# Patient Record
Sex: MALE | Race: WHITE | Employment: FULL TIME | URBAN - METROPOLITAN AREA
[De-identification: names, ages, dates, MRNs, and addresses within clinical notes are randomized per-mention and may not be internally consistent; named-entity substitution may affect disease eponyms.]

---

## 2024-03-26 ENCOUNTER — OFFICE VISIT (OUTPATIENT)
Dept: FAMILY MEDICINE CLINIC | Age: 64
End: 2024-03-26
Payer: COMMERCIAL

## 2024-03-26 VITALS
HEIGHT: 75 IN | SYSTOLIC BLOOD PRESSURE: 110 MMHG | DIASTOLIC BLOOD PRESSURE: 76 MMHG | HEART RATE: 76 BPM | OXYGEN SATURATION: 98 % | BODY MASS INDEX: 25.36 KG/M2 | WEIGHT: 204 LBS

## 2024-03-26 DIAGNOSIS — Z12.11 COLON CANCER SCREENING: ICD-10-CM

## 2024-03-26 DIAGNOSIS — Z00.00 ANNUAL PHYSICAL EXAM: Primary | ICD-10-CM

## 2024-03-26 DIAGNOSIS — N40.0 BENIGN PROSTATIC HYPERPLASIA, UNSPECIFIED WHETHER LOWER URINARY TRACT SYMPTOMS PRESENT: ICD-10-CM

## 2024-03-26 PROCEDURE — 99396 PREV VISIT EST AGE 40-64: CPT | Performed by: STUDENT IN AN ORGANIZED HEALTH CARE EDUCATION/TRAINING PROGRAM

## 2024-03-26 SDOH — SOCIAL STABILITY: SOCIAL INSECURITY: WITHIN THE LAST YEAR, HAVE YOU BEEN AFRAID OF YOUR PARTNER OR EX-PARTNER?: NO

## 2024-03-26 SDOH — HEALTH STABILITY: MENTAL HEALTH: HOW MANY STANDARD DRINKS CONTAINING ALCOHOL DO YOU HAVE ON A TYPICAL DAY?: 5 OR 6

## 2024-03-26 SDOH — SOCIAL STABILITY: SOCIAL NETWORK: HOW OFTEN DO YOU GET TOGETHER WITH FRIENDS OR RELATIVES?: ONCE A WEEK

## 2024-03-26 SDOH — SOCIAL STABILITY: SOCIAL NETWORK: IN A TYPICAL WEEK, HOW MANY TIMES DO YOU TALK ON THE PHONE WITH FAMILY, FRIENDS, OR NEIGHBORS?: ONCE A WEEK

## 2024-03-26 SDOH — ECONOMIC STABILITY: INCOME INSECURITY: IN THE LAST 12 MONTHS, WAS THERE A TIME WHEN YOU WERE NOT ABLE TO PAY THE MORTGAGE OR RENT ON TIME?: NO

## 2024-03-26 SDOH — SOCIAL STABILITY: SOCIAL NETWORK: ARE YOU MARRIED, WIDOWED, DIVORCED, SEPARATED, NEVER MARRIED, OR LIVING WITH A PARTNER?: MARRIED

## 2024-03-26 SDOH — ECONOMIC STABILITY: FOOD INSECURITY: WITHIN THE PAST 12 MONTHS, YOU WORRIED THAT YOUR FOOD WOULD RUN OUT BEFORE YOU GOT MONEY TO BUY MORE.: NEVER TRUE

## 2024-03-26 SDOH — ECONOMIC STABILITY: INCOME INSECURITY: HOW HARD IS IT FOR YOU TO PAY FOR THE VERY BASICS LIKE FOOD, HOUSING, MEDICAL CARE, AND HEATING?: NOT VERY HARD

## 2024-03-26 SDOH — SOCIAL STABILITY: SOCIAL INSECURITY
WITHIN THE LAST YEAR, HAVE YOU BEEN KICKED, HIT, SLAPPED, OR OTHERWISE PHYSICALLY HURT BY YOUR PARTNER OR EX-PARTNER?: NO

## 2024-03-26 SDOH — SOCIAL STABILITY: SOCIAL NETWORK
DO YOU BELONG TO ANY CLUBS OR ORGANIZATIONS SUCH AS CHURCH GROUPS UNIONS, FRATERNAL OR ATHLETIC GROUPS, OR SCHOOL GROUPS?: NO

## 2024-03-26 SDOH — ECONOMIC STABILITY: FOOD INSECURITY: WITHIN THE PAST 12 MONTHS, THE FOOD YOU BOUGHT JUST DIDN'T LAST AND YOU DIDN'T HAVE MONEY TO GET MORE.: NEVER TRUE

## 2024-03-26 SDOH — SOCIAL STABILITY: SOCIAL NETWORK: HOW OFTEN DO YOU ATTEND CHURCH OR RELIGIOUS SERVICES?: NEVER

## 2024-03-26 SDOH — ECONOMIC STABILITY: HOUSING INSECURITY
IN THE LAST 12 MONTHS, WAS THERE A TIME WHEN YOU DID NOT HAVE A STEADY PLACE TO SLEEP OR SLEPT IN A SHELTER (INCLUDING NOW)?: NO

## 2024-03-26 SDOH — HEALTH STABILITY: MENTAL HEALTH: HOW OFTEN DO YOU HAVE A DRINK CONTAINING ALCOHOL?: 2-3 TIMES A WEEK

## 2024-03-26 SDOH — SOCIAL STABILITY: SOCIAL INSECURITY
WITHIN THE LAST YEAR, HAVE TO BEEN RAPED OR FORCED TO HAVE ANY KIND OF SEXUAL ACTIVITY BY YOUR PARTNER OR EX-PARTNER?: NO

## 2024-03-26 SDOH — SOCIAL STABILITY: SOCIAL INSECURITY: WITHIN THE LAST YEAR, HAVE YOU BEEN HUMILIATED OR EMOTIONALLY ABUSED IN OTHER WAYS BY YOUR PARTNER OR EX-PARTNER?: NO

## 2024-03-26 SDOH — SOCIAL STABILITY: SOCIAL NETWORK: HOW OFTEN DO YOU ATTENT MEETINGS OF THE CLUB OR ORGANIZATION YOU BELONG TO?: NEVER

## 2024-03-26 SDOH — HEALTH STABILITY: MENTAL HEALTH
STRESS IS WHEN SOMEONE FEELS TENSE, NERVOUS, ANXIOUS, OR CAN'T SLEEP AT NIGHT BECAUSE THEIR MIND IS TROUBLED. HOW STRESSED ARE YOU?: NOT AT ALL

## 2024-03-26 SDOH — HEALTH STABILITY: PHYSICAL HEALTH: ON AVERAGE, HOW MANY DAYS PER WEEK DO YOU ENGAGE IN MODERATE TO STRENUOUS EXERCISE (LIKE A BRISK WALK)?: 4 DAYS

## 2024-03-26 SDOH — ECONOMIC STABILITY: TRANSPORTATION INSECURITY
IN THE PAST 12 MONTHS, HAS LACK OF TRANSPORTATION KEPT YOU FROM MEETINGS, WORK, OR FROM GETTING THINGS NEEDED FOR DAILY LIVING?: NO

## 2024-03-26 SDOH — HEALTH STABILITY: PHYSICAL HEALTH: ON AVERAGE, HOW MANY MINUTES DO YOU ENGAGE IN EXERCISE AT THIS LEVEL?: 40 MIN

## 2024-03-26 SDOH — ECONOMIC STABILITY: TRANSPORTATION INSECURITY
IN THE PAST 12 MONTHS, HAS THE LACK OF TRANSPORTATION KEPT YOU FROM MEDICAL APPOINTMENTS OR FROM GETTING MEDICATIONS?: NO

## 2024-03-26 ASSESSMENT — PATIENT HEALTH QUESTIONNAIRE - PHQ9
SUM OF ALL RESPONSES TO PHQ QUESTIONS 1-9: 0
2. FEELING DOWN, DEPRESSED OR HOPELESS: NOT AT ALL
SUM OF ALL RESPONSES TO PHQ QUESTIONS 1-9: 0
SUM OF ALL RESPONSES TO PHQ QUESTIONS 1-9: 0
1. LITTLE INTEREST OR PLEASURE IN DOING THINGS: NOT AT ALL
SUM OF ALL RESPONSES TO PHQ QUESTIONS 1-9: 0
SUM OF ALL RESPONSES TO PHQ9 QUESTIONS 1 & 2: 0

## 2024-03-26 NOTE — PROGRESS NOTES
4/9/2024    Narayan Jones    Chief Complaint   Patient presents with    Annual Exam     - no complaints       HPI  [facility]  Narayan is a 63 y.o. male with a PMHx as listed below who presents today for annual exam.     Hx. Hip replacement in his 30s    Recently had skin tags removed he checks his skin annually  Dr. Lowry     No acute complaints.     1. Annual physical exam    2. Colon cancer screening    3. Benign prostatic hyperplasia, unspecified whether lower urinary tract symptoms present             REVIEW OF SYMPTOMS    Review of Systems   Constitutional:  Negative for chills and fatigue.   HENT:  Negative for congestion and sore throat.    Respiratory:  Negative for shortness of breath and wheezing.    Cardiovascular:  Negative for chest pain and palpitations.   Gastrointestinal:  Negative for abdominal pain and nausea.   Genitourinary:  Negative for frequency and urgency.   Neurological:  Negative for light-headedness.       PAST MEDICAL HISTORY  History reviewed. No pertinent past medical history.    FAMILY HISTORY  Family History   Problem Relation Age of Onset    Lung Cancer Mother     Lung Disease Mother     Other Father         Padget's Disease    Breast Cancer Sister     Glaucoma Maternal Grandmother     Osteoarthritis Other     Osteoporosis Other        SOCIAL HISTORY  Social History     Socioeconomic History    Marital status:      Spouse name: None    Number of children: None    Years of education: None    Highest education level: None   Tobacco Use    Smoking status: Never    Smokeless tobacco: Never   Substance and Sexual Activity    Alcohol use: Yes     Comment: OCASSIONALLY    Drug use: No     Social Determinants of Health     Financial Resource Strain: Low Risk  (3/26/2024)    Overall Financial Resource Strain (CARDIA)     Difficulty of Paying Living Expenses: Not very hard   Food Insecurity: No Food Insecurity (3/26/2024)    Hunger Vital Sign     Worried About

## 2024-03-27 ENCOUNTER — TELEPHONE (OUTPATIENT)
Dept: GASTROENTEROLOGY | Age: 64
End: 2024-03-27

## 2024-03-27 NOTE — TELEPHONE ENCOUNTER
Called pt. In regards to a referral for a colon screening. Lm for pt to call back to make an appt

## 2024-04-03 ENCOUNTER — TELEPHONE (OUTPATIENT)
Dept: GASTROENTEROLOGY | Age: 64
End: 2024-04-03

## 2024-04-03 NOTE — TELEPHONE ENCOUNTER
Called pt. In regards to a referral for a colon screening. Lm for pt to call back to make an appt

## 2024-05-01 ENCOUNTER — PREP FOR PROCEDURE (OUTPATIENT)
Dept: GASTROENTEROLOGY | Age: 64
End: 2024-05-01

## 2024-05-01 ENCOUNTER — OFFICE VISIT (OUTPATIENT)
Dept: GASTROENTEROLOGY | Age: 64
End: 2024-05-01
Payer: COMMERCIAL

## 2024-05-01 VITALS
HEART RATE: 84 BPM | DIASTOLIC BLOOD PRESSURE: 80 MMHG | SYSTOLIC BLOOD PRESSURE: 120 MMHG | HEIGHT: 75 IN | WEIGHT: 200.2 LBS | BODY MASS INDEX: 24.89 KG/M2 | OXYGEN SATURATION: 98 %

## 2024-05-01 DIAGNOSIS — Z86.010 PERSONAL HISTORY OF COLONIC POLYPS: ICD-10-CM

## 2024-05-01 DIAGNOSIS — Z87.19 HISTORY OF DIVERTICULOSIS: ICD-10-CM

## 2024-05-01 DIAGNOSIS — Z86.010 HISTORY OF COLON POLYPS: Primary | ICD-10-CM

## 2024-05-01 PROBLEM — Z86.0100 PERSONAL HISTORY OF COLONIC POLYPS: Status: ACTIVE | Noted: 2024-05-01

## 2024-05-01 PROCEDURE — 99204 OFFICE O/P NEW MOD 45 MIN: CPT | Performed by: NURSE PRACTITIONER

## 2024-05-01 RX ORDER — SODIUM CHLORIDE 0.9 % (FLUSH) 0.9 %
5-40 SYRINGE (ML) INJECTION EVERY 12 HOURS SCHEDULED
OUTPATIENT
Start: 2024-05-01

## 2024-05-01 RX ORDER — POLYETHYLENE GLYCOL 3350, SODIUM SULFATE, SODIUM CHLORIDE, POTASSIUM CHLORIDE, ASCORBIC ACID, SODIUM ASCORBATE 140-9-5.2G
1 KIT ORAL ONCE
Qty: 1 EACH | Refills: 0 | Status: SHIPPED | OUTPATIENT
Start: 2024-05-01 | End: 2024-05-01

## 2024-05-01 RX ORDER — SODIUM CHLORIDE 9 MG/ML
INJECTION, SOLUTION INTRAVENOUS PRN
OUTPATIENT
Start: 2024-05-01

## 2024-05-01 RX ORDER — SIMETHICONE 80 MG
80 TABLET,CHEWABLE ORAL ONCE
Qty: 3 TABLET | Refills: 0 | Status: SHIPPED | OUTPATIENT
Start: 2024-05-01 | End: 2024-05-01

## 2024-05-01 RX ORDER — SODIUM CHLORIDE 0.9 % (FLUSH) 0.9 %
5-40 SYRINGE (ML) INJECTION PRN
OUTPATIENT
Start: 2024-05-01

## 2024-05-01 ASSESSMENT — ENCOUNTER SYMPTOMS
CONSTIPATION: 0
SHORTNESS OF BREATH: 0
COUGH: 0
BACK PAIN: 1
COLOR CHANGE: 0
EYE DISCHARGE: 0
ABDOMINAL PAIN: 0
WHEEZING: 0
EYE PAIN: 0
DIARRHEA: 0
VOMITING: 0
BLOOD IN STOOL: 0
NAUSEA: 0

## 2024-05-01 NOTE — PROGRESS NOTES
Narayan Jones 63 y.o. male was seen by STEW Dee on 05/01/24     Wt Readings from Last 3 Encounters:   05/01/24 90.8 kg (200 lb 3.2 oz)   03/26/24 92.5 kg (204 lb)   02/14/23 96.7 kg (213 lb 1.6 oz)       HPI  Narayan Jones is a pleasant 63 y.o.  male who presents today with history of colon polyps and diverticulosis to schedule a colonoscopy.  He has a past medical history of benign prostatic hyperplasia and right inguinal hernia.  His last colonoscopy was done by Dr. Le on 11-9-2012 showing hyperplastic colon polyps that were removed, diverticulosis, and internal hemorrhoids.  He denies changes in his bowel pattern.  His typical bowel pattern is daily with soft brown formed stools.  No diarrhea or constipation.  No blood in his stools or melena.  His appetite is good without early satiety.  His weight is stable.  No nausea or vomiting.  No abdominal pain, bloating or distention.  No heartburn or acid reflux.  No nocturnal awakenings with acid reflux.  No dysphagia or pain with swallowing.  No family history of stomach or colon cancer.    ROS  Review of Systems   Constitutional:  Negative for appetite change, chills, diaphoresis, fatigue and fever.   HENT:  Negative for ear pain and hearing loss.    Eyes:  Positive for visual disturbance. Negative for pain and discharge.   Respiratory:  Negative for cough, shortness of breath and wheezing.    Cardiovascular:  Negative for chest pain, palpitations and leg swelling.   Gastrointestinal:  Negative for abdominal pain, blood in stool, constipation, diarrhea, nausea and vomiting.   Endocrine: Negative for cold intolerance and heat intolerance.   Genitourinary:  Negative for dysuria, frequency, hematuria and urgency.   Musculoskeletal:  Positive for back pain (intermittent). Negative for myalgias and neck pain.   Skin:  Negative for color change, pallor and rash.   Allergic/Immunologic: Negative for environmental allergies and food allergies.

## 2024-05-24 ENCOUNTER — COMMUNITY OUTREACH (OUTPATIENT)
Dept: FAMILY MEDICINE CLINIC | Age: 64
End: 2024-05-24

## 2024-06-04 ENCOUNTER — TELEPHONE (OUTPATIENT)
Dept: GASTROENTEROLOGY | Age: 64
End: 2024-06-04

## 2024-06-17 ENCOUNTER — TELEPHONE (OUTPATIENT)
Dept: GASTROENTEROLOGY | Age: 64
End: 2024-06-17

## 2024-06-28 NOTE — PROGRESS NOTES
Patient will be called with an arrival time on 7/5/2024 for his procedure at [facility] on 7/8/2024.    NOTHING TO EAT OR DRINK AFTER MIDNIGHT DAY OF SURGERY    1. Enter thru the hospital main entrance on day of surgery, check in at the Information Desk. If you arrive prior to 6:00am, enter thru the ER entrance.    2. Follow the directions as prescribed by the doctor for your procedure and medications.         Morning of surgery take:         Stop vitamins, supplements and NSAIDS:      3. Check with your Doctor regarding stopping blood thinners and follow their instructions.    4. Do not smoke, vape or use chewing tobacco morning of surgery. Do not drink any alcoholic beverages 24 hours prior to surgery.       This includes NA Beer. No street drugs 7 days prior to surgery.    5. If you have dentures, contacts of glasses they will be removed before going to the OR; please bring a case.    6. Please bring picture ID, insurance card, paperwork from the doctor’s office (H & P, Consent, & card for implantable devices).    7. Take a shower with an antibacterial soap the night before surgery and the morning of surgery. Do not put anything on your skin      After your morning shower.    8. You will need a responsible adult to drive you home and check on you after surgery.

## 2024-07-05 ENCOUNTER — ANESTHESIA EVENT (OUTPATIENT)
Dept: ENDOSCOPY | Age: 64
End: 2024-07-05
Payer: COMMERCIAL

## 2024-07-05 NOTE — PROGRESS NOTES
Spoke with patient and he will arrive at 1300 at [facility] on 7/8/2024 for his procedure at 1430. Also went over prep time, orders are in epic.

## 2024-07-08 ENCOUNTER — HOSPITAL ENCOUNTER (OUTPATIENT)
Age: 64
Setting detail: OUTPATIENT SURGERY
Discharge: HOME | End: 2024-07-08
Attending: INTERNAL MEDICINE | Admitting: INTERNAL MEDICINE
Payer: COMMERCIAL

## 2024-07-08 ENCOUNTER — ANESTHESIA (OUTPATIENT)
Dept: ENDOSCOPY | Age: 64
End: 2024-07-08
Payer: COMMERCIAL

## 2024-07-08 VITALS
HEIGHT: 75 IN | BODY MASS INDEX: 24.87 KG/M2 | TEMPERATURE: 97.9 F | DIASTOLIC BLOOD PRESSURE: 77 MMHG | HEART RATE: 75 BPM | WEIGHT: 200 LBS | SYSTOLIC BLOOD PRESSURE: 109 MMHG | OXYGEN SATURATION: 98 % | RESPIRATION RATE: 18 BRPM

## 2024-07-08 DIAGNOSIS — Z86.010 PERSONAL HISTORY OF COLONIC POLYPS: ICD-10-CM

## 2024-07-08 PROBLEM — Z12.11 COLON CANCER SCREENING: Status: ACTIVE | Noted: 2024-07-08

## 2024-07-08 PROCEDURE — 3700000000 HC ANESTHESIA ATTENDED CARE: Performed by: INTERNAL MEDICINE

## 2024-07-08 PROCEDURE — 2580000003 HC RX 258: Performed by: ANESTHESIOLOGY

## 2024-07-08 PROCEDURE — 7100000010 HC PHASE II RECOVERY - FIRST 15 MIN: Performed by: INTERNAL MEDICINE

## 2024-07-08 PROCEDURE — 6360000002 HC RX W HCPCS

## 2024-07-08 PROCEDURE — 45385 COLONOSCOPY W/LESION REMOVAL: CPT | Performed by: INTERNAL MEDICINE

## 2024-07-08 PROCEDURE — 88305 TISSUE EXAM BY PATHOLOGIST: CPT

## 2024-07-08 PROCEDURE — 3609010600 HC COLONOSCOPY POLYPECTOMY SNARE/COLD BIOPSY: Performed by: INTERNAL MEDICINE

## 2024-07-08 PROCEDURE — 45380 COLONOSCOPY AND BIOPSY: CPT | Performed by: INTERNAL MEDICINE

## 2024-07-08 PROCEDURE — 3700000001 HC ADD 15 MINUTES (ANESTHESIA): Performed by: INTERNAL MEDICINE

## 2024-07-08 PROCEDURE — 2709999900 HC NON-CHARGEABLE SUPPLY: Performed by: INTERNAL MEDICINE

## 2024-07-08 PROCEDURE — 7100000011 HC PHASE II RECOVERY - ADDTL 15 MIN: Performed by: INTERNAL MEDICINE

## 2024-07-08 RX ORDER — SODIUM CHLORIDE, SODIUM LACTATE, POTASSIUM CHLORIDE, CALCIUM CHLORIDE 600; 310; 30; 20 MG/100ML; MG/100ML; MG/100ML; MG/100ML
INJECTION, SOLUTION INTRAVENOUS CONTINUOUS
Status: DISCONTINUED | OUTPATIENT
Start: 2024-07-08 | End: 2024-07-08 | Stop reason: HOSPADM

## 2024-07-08 RX ORDER — PROPOFOL 10 MG/ML
INJECTION, EMULSION INTRAVENOUS PRN
Status: DISCONTINUED | OUTPATIENT
Start: 2024-07-08 | End: 2024-07-08 | Stop reason: SDUPTHER

## 2024-07-08 RX ORDER — SODIUM CHLORIDE 9 MG/ML
INJECTION, SOLUTION INTRAVENOUS PRN
Status: DISCONTINUED | OUTPATIENT
Start: 2024-07-08 | End: 2024-07-08 | Stop reason: HOSPADM

## 2024-07-08 RX ORDER — SODIUM CHLORIDE 0.9 % (FLUSH) 0.9 %
5-40 SYRINGE (ML) INJECTION EVERY 12 HOURS SCHEDULED
Status: DISCONTINUED | OUTPATIENT
Start: 2024-07-08 | End: 2024-07-08 | Stop reason: HOSPADM

## 2024-07-08 RX ORDER — SODIUM CHLORIDE 0.9 % (FLUSH) 0.9 %
5-40 SYRINGE (ML) INJECTION PRN
Status: DISCONTINUED | OUTPATIENT
Start: 2024-07-08 | End: 2024-07-08 | Stop reason: HOSPADM

## 2024-07-08 RX ADMIN — SODIUM CHLORIDE, POTASSIUM CHLORIDE, SODIUM LACTATE AND CALCIUM CHLORIDE: 600; 310; 30; 20 INJECTION, SOLUTION INTRAVENOUS at 13:31

## 2024-07-08 RX ADMIN — PROPOFOL 500 MG: 10 INJECTION, EMULSION INTRAVENOUS at 16:21

## 2024-07-08 ASSESSMENT — PAIN - FUNCTIONAL ASSESSMENT
PAIN_FUNCTIONAL_ASSESSMENT: 0-10

## 2024-07-08 NOTE — PROGRESS NOTES
Patient and family updated on delay of case,patient and family upset requesting to speak with someone from endoscopy

## 2024-07-08 NOTE — ANESTHESIA POSTPROCEDURE EVALUATION
Department of Anesthesiology  Postprocedure Note    Patient: Narayan Jones  MRN: [id number]  YOB: 1960  Date of evaluation: 7/8/2024    Procedure Summary       Date: 07/08/24 Room / Location: [place] [address] / [facility]    Anesthesia Start: 1619 Anesthesia Stop:     Procedure: COLONOSCOPY POLYPECTOMY SNARE/BIOPSY Diagnosis:       Personal history of colonic polyps      (Personal history of colonic polyps [Z86.010])    Surgeons: Reagan Lorenzo MD Responsible Provider: Morgan Silva MD    Anesthesia Type: MAC ASA Status: 2            Anesthesia Type: MAC    Danny Phase I: Danny Score: 10    Danny Phase II:      Anesthesia Post Evaluation    Patient location during evaluation: bedside  Patient participation: complete - patient participated  Level of consciousness: awake  Pain score: 0  Airway patency: patent  Nausea & Vomiting: no vomiting and no nausea  Cardiovascular status: hemodynamically stable  Respiratory status: acceptable, airway suctioned, spontaneous ventilation and room air  Hydration status: stable  Pain management: adequate    No notable events documented.

## 2024-07-08 NOTE — ANESTHESIA PRE PROCEDURE
Department of Anesthesiology  Preprocedure Note       Name:  Narayan Jones   Age:  64 y.o.  :  1960                                          MRN:  [id number]         Date:  2024      Surgeon: Surgeon(s):  Reagan Lorenzo MD    Procedure: Procedure(s):  COLONOSCOPY DIAGNOSTIC    Medications prior to admission:   Prior to Admission medications    Medication Sig Start Date End Date Taking? Authorizing Provider   simethicone (MYLICON) 80 MG chewable tablet Take 1 tablet by mouth once for 1 dose 24  Leticia Motta, APRN - CNP   Multiple Vitamins-Minerals (THERAPEUTIC MULTIVITAMIN-MINERALS) tablet Take 1 tablet by mouth daily    Provider, MD Shannan       Current medications:    No current facility-administered medications for this encounter.     Current Outpatient Medications   Medication Sig Dispense Refill   • simethicone (MYLICON) 80 MG chewable tablet Take 1 tablet by mouth once for 1 dose 3 tablet 0   • Multiple Vitamins-Minerals (THERAPEUTIC MULTIVITAMIN-MINERALS) tablet Take 1 tablet by mouth daily         Allergies:  No Known Allergies    Problem List:    Patient Active Problem List   Diagnosis Code   • Right inguinal hernia K40.90   • Benign prostatic hyperplasia N40.0   • Personal history of colonic polyps Z86.010       Past Medical History:        Diagnosis Date   • Circulation problem     left hip       Past Surgical History:        Procedure Laterality Date   • COLONOSCOPY  2012    int hemorrhoids, polyps times 4   • HERNIA REPAIR Left    • JOINT REPLACEMENT      LEFT HIP REPLACEMENT       Social History:    Social History     Tobacco Use   • Smoking status: Never   • Smokeless tobacco: Never   Substance Use Topics   • Alcohol use: Yes     Comment: OCASSIONALLY                                Counseling given: Not Answered      Vital Signs (Current):   Vitals:    24 1331   Weight: 90.7 kg (200 lb)   Height: 1.905 m (6' 3\")                                            
Department of Anesthesiology  Preprocedure Note       Name:  Narayan Jones   Age:  64 y.o.  :  1960                                          MRN:  [id number]         Date:  2024      Surgeon: Surgeon(s):  Reagan Lorenzo MD    Procedure: Procedure(s):  COLONOSCOPY DIAGNOSTIC    Medications prior to admission:   Prior to Admission medications    Medication Sig Start Date End Date Taking? Authorizing Provider   simethicone (MYLICON) 80 MG chewable tablet Take 1 tablet by mouth once for 1 dose 24  Leticia Motta, APRN - CNP   Multiple Vitamins-Minerals (THERAPEUTIC MULTIVITAMIN-MINERALS) tablet Take 1 tablet by mouth daily    Provider, MD Shannan       Current medications:    Current Facility-Administered Medications   Medication Dose Route Frequency Provider Last Rate Last Admin   • lactated ringers IV soln infusion   IntraVENous Continuous Gregory Vázquez MD 50 mL/hr at 24 1331 New Bag at 24 1331   • sodium chloride flush 0.9 % injection 5-40 mL  5-40 mL IntraVENous 2 times per day Astrid Echols APRN - CNP       • sodium chloride flush 0.9 % injection 5-40 mL  5-40 mL IntraVENous PRN Astrid Echols APRN - CNP       • 0.9 % sodium chloride infusion   IntraVENous PRN Astrid Echols APRN - CNP           Allergies:  No Known Allergies    Problem List:    Patient Active Problem List   Diagnosis Code   • Right inguinal hernia K40.90   • Benign prostatic hyperplasia N40.0   • Personal history of colonic polyps Z86.010       Past Medical History:        Diagnosis Date   • Circulation problem     left hip       Past Surgical History:        Procedure Laterality Date   • COLONOSCOPY  2012    int hemorrhoids, polyps times 4   • HERNIA REPAIR Left    • JOINT REPLACEMENT      LEFT HIP REPLACEMENT       Social History:    Social History     Tobacco Use   • Smoking status: Never   • Smokeless tobacco: Never   Substance Use Topics   • Alcohol use: Yes     Comment: 
  BP Readings from Last 3 Encounters:   05/01/24 120/80   03/26/24 110/76   02/14/23 122/88       NPO Status:                                                                                 BMI:   Wt Readings from Last 3 Encounters:   05/01/24 90.8 kg (200 lb 3.2 oz)   03/26/24 92.5 kg (204 lb)   02/14/23 96.7 kg (213 lb 1.6 oz)     Body mass index is 25 kg/m².    CBC:   Lab Results   Component Value Date/Time    WBC 9.6 07/23/2012 04:00 AM    RBC 4.94 07/23/2012 04:00 AM    HGB 15.5 07/23/2012 04:00 AM    HCT 45.2 07/23/2012 04:00 AM    MCV 91.6 07/23/2012 04:00 AM    RDW 12.9 07/23/2012 04:00 AM     07/23/2012 04:00 AM       CMP:   Lab Results   Component Value Date/Time     02/14/2023 10:04 AM    K 4.3 02/14/2023 10:04 AM     02/14/2023 10:04 AM    CO2 26 02/14/2023 10:04 AM    BUN 12 02/14/2023 10:04 AM    CREATININE 1.0 02/14/2023 10:04 AM    GFRAA >60 07/23/2012 04:00 AM    AGRATIO 2.0 02/14/2023 10:04 AM    LABGLOM >60 02/14/2023 10:04 AM    GLUCOSE 96 02/14/2023 10:04 AM    CALCIUM 9.3 02/14/2023 10:04 AM    BILITOT 0.8 02/14/2023 10:04 AM    ALKPHOS 51 02/14/2023 10:04 AM    AST 13 02/14/2023 10:04 AM    ALT 14 02/14/2023 10:04 AM       POC Tests: No results for input(s): \"POCGLU\", \"POCNA\", \"POCK\", \"POCCL\", \"POCBUN\", \"POCHEMO\", \"POCHCT\" in the last 72 hours.    Coags: No results found for: \"PROTIME\", \"INR\", \"APTT\"    HCG (If Applicable): No results found for: \"PREGTESTUR\", \"PREGSERUM\", \"HCG\", \"HCGQUANT\"     ABGs: No results found for: \"PHART\", \"PO2ART\", \"NBM2CGM\", \"ZCE6GJD\", \"BEART\", \"B4CZVEMT\"     Type & Screen (If Applicable):  No results found for: \"LABABO\"    Drug/Infectious Status (If Applicable):  No results found for: \"HIV\", \"HEPCAB\"    COVID-19 Screening (If Applicable): No results found for: \"COVID19\"        Anesthesia Evaluation    Airway:           Dental:          Pulmonary:                              Cardiovascular:             Beta Blocker:  Not on Beta Blocker

## 2024-07-08 NOTE — DISCHARGE INSTRUCTIONS
COLONOSCOPY    DR. MC      FOLLOW UP APPOINTMENT AS NEEDED.     REPEAT PROCEDURE IN 5-7 YEARS DEPENDING ON PATHOLOGY RESULTS.    CALL FOR PATHOLOGY RESULTS IN ONE WEEK    What to expect at home:  Your Recovery   Your doctor will tell you when you can eat and do your other usual activities Your doctor will talk to you about when you will need your next colonoscopy. Your doctor can help you decide how often you need to be checked. This will depend on the results of your test and your risk for colorectal cancer.  After the test, you may be bloated or have gas pains. You may need to pass gas. If a biopsy was done or a polyp was removed, you may have streaks of blood in your stool (feces) for a few days.  This care sheet gives you a general idea about how long it will take for you to recover. But each person recovers at a different pace. Follow the steps below to get better as quickly as possible.  How can you care for yourself at home?  Activity  Rest when you feel tired.  Diet  Follow your doctor's directions for eating.  Unless your doctor has told you not to, drink plenty of fluids. This helps to replace the fluids that were lost during the colon prep.  DO NOT DRINK ALCOHOL.  Medicines  Your doctor will tell you if and when you can restart your medicines. He or she will also give you instructions about taking any new medicines.  If you take blood thinners, such as warfarin (Coumadin), clopidogrel (Plavix), or aspirin, be sure to talk to your doctor. He or she will tell you if and when to start taking those medicines again. Make sure that you understand exactly what your doctor wants you to do.  If polyps were removed or a biopsy was done during the test, your doctor may tell you not to take aspirin or other anti-inflammatory medicines for a few days. These include ibuprofen (Advil, Motrin) and naproxen (Aleve).  Other instructions: Anethesia  For your safety, do not drive or operate machinery for 24 hours.  Do not

## 2024-07-08 NOTE — PROGRESS NOTES
1654: patient returns to [facility], VSS  1719: discharge instructions reviewed with patient and partner, sherine. Both verbalized understanding. IV removed, VSS. Patient dressing, call light in reach.   1723: Patient taken via wheelchair to DC to car with family

## 2024-07-08 NOTE — PROGRESS NOTES
PT ALERT AND ORIENTED X 4. WIFE AT BEDSIDE. PRE-OP QUESTIONS ASKED AND ANSWERED APPROPRIATELY BY PT. NAME, , ARMBAND VERIFIED, PROCEDURE, ALLERGIES, IMPLANTS, PREP STATUS, LAST PO INTAKE, HISTORY, MEDS.

## 2024-07-08 NOTE — H&P
ENDOSCOPY   Pre-operative History and Physical    Patient: Narayan Jones  : 1960      History Obtained From:  patient, electronic medical record        HISTORY OF PRESENT ILLNESS:                The patient is a 64 y.o. male with significant past medical history as below who presents for colonoscopy    Indication Colon cancer screening   Previous colonoscopy with hyperplastic polyps.     Past Medical History:        Diagnosis Date    Circulation problem     left hip       Past Surgical History:        Procedure Laterality Date    COLONOSCOPY  2012    int hemorrhoids, polyps times 4    HERNIA REPAIR Left     JOINT REPLACEMENT      LEFT HIP REPLACEMENT         Current Medications:    Medications    Prior to Admission medications    Medication Sig Start Date End Date Taking? Authorizing Provider   simethicone (MYLICON) 80 MG chewable tablet Take 1 tablet by mouth once for 1 dose 24  Leticia Motta, APRN - CNP   Multiple Vitamins-Minerals (THERAPEUTIC MULTIVITAMIN-MINERALS) tablet Take 1 tablet by mouth daily    Provider, MD Shannan      Scheduled Medications:    sodium chloride flush  5-40 mL IntraVENous 2 times per day     Infusions:    sodium chloride       PRN Medications: sodium chloride flush, sodium chloride    Allergies: No Known Allergies      Allergies:  Patient has no known allergies.    Social History:   TOBACCO:   reports that he has never smoked. He has never used smokeless tobacco.  ETOH:   reports current alcohol use.    Family History:       Problem Relation Age of Onset    Lung Cancer Mother     Lung Disease Mother     Other Father         Padget's Disease    Breast Cancer Sister     Glaucoma Maternal Grandmother     Osteoarthritis Other     Osteoporosis Other        REVIEW OF SYSTEMS:    Negative except for HPI        PHYSICAL EXAM:      Vitals:    Ht 1.905 m (6' 3\")   Wt 90.7 kg (200 lb)   BMI 25.00 kg/m²     General Appearance:    Alert, cooperative, no

## 2024-08-07 PROBLEM — Z12.11 COLON CANCER SCREENING: Status: RESOLVED | Noted: 2024-07-08 | Resolved: 2024-08-07

## 2025-03-27 ENCOUNTER — OFFICE VISIT (OUTPATIENT)
Dept: FAMILY MEDICINE CLINIC | Age: 65
End: 2025-03-27
Payer: COMMERCIAL

## 2025-03-27 VITALS
DIASTOLIC BLOOD PRESSURE: 68 MMHG | HEART RATE: 82 BPM | OXYGEN SATURATION: 96 % | HEIGHT: 75 IN | SYSTOLIC BLOOD PRESSURE: 100 MMHG | WEIGHT: 206 LBS | BODY MASS INDEX: 25.61 KG/M2

## 2025-03-27 DIAGNOSIS — Z00.00 ANNUAL PHYSICAL EXAM: ICD-10-CM

## 2025-03-27 DIAGNOSIS — Z13.220 LIPID SCREENING: ICD-10-CM

## 2025-03-27 DIAGNOSIS — Z00.00 ANNUAL PHYSICAL EXAM: Primary | ICD-10-CM

## 2025-03-27 LAB
ALBUMIN SERPL-MCNC: 4.3 G/DL (ref 3.4–5)
ALBUMIN/GLOB SERPL: 2.4 {RATIO} (ref 1.1–2.2)
ALP SERPL-CCNC: 42 U/L (ref 40–129)
ALT SERPL-CCNC: 17 U/L (ref 10–40)
ANION GAP SERPL CALCULATED.3IONS-SCNC: 7 MMOL/L (ref 3–16)
AST SERPL-CCNC: 16 U/L (ref 15–37)
BASOPHILS # BLD: 0 K/UL (ref 0–0.2)
BASOPHILS NFR BLD: 0.8 %
BILIRUB SERPL-MCNC: 0.5 MG/DL (ref 0–1)
BUN SERPL-MCNC: 13 MG/DL (ref 7–20)
CALCIUM SERPL-MCNC: 9.1 MG/DL (ref 8.3–10.6)
CHLORIDE SERPL-SCNC: 101 MMOL/L (ref 99–110)
CHOLEST SERPL-MCNC: 220 MG/DL (ref 0–199)
CO2 SERPL-SCNC: 27 MMOL/L (ref 21–32)
CREAT SERPL-MCNC: 0.9 MG/DL (ref 0.8–1.3)
DEPRECATED RDW RBC AUTO: 13.2 % (ref 12.4–15.4)
EOSINOPHIL # BLD: 0.2 K/UL (ref 0–0.6)
EOSINOPHIL NFR BLD: 5.4 %
GFR SERPLBLD CREATININE-BSD FMLA CKD-EPI: >90 ML/MIN/{1.73_M2}
GLUCOSE SERPL-MCNC: 98 MG/DL (ref 70–99)
HCT VFR BLD AUTO: 45.7 % (ref 40.5–52.5)
HDLC SERPL-MCNC: 55 MG/DL (ref 40–60)
HGB BLD-MCNC: 15.4 G/DL (ref 13.5–17.5)
LDL CHOLESTEROL: 150 MG/DL
LYMPHOCYTES # BLD: 1.5 K/UL (ref 1–5.1)
LYMPHOCYTES NFR BLD: 32.8 %
MCH RBC QN AUTO: 31.7 PG (ref 26–34)
MCHC RBC AUTO-ENTMCNC: 33.7 G/DL (ref 31–36)
MCV RBC AUTO: 93.8 FL (ref 80–100)
MONOCYTES # BLD: 0.4 K/UL (ref 0–1.3)
MONOCYTES NFR BLD: 8.3 %
NEUTROPHILS # BLD: 2.4 K/UL (ref 1.7–7.7)
NEUTROPHILS NFR BLD: 52.7 %
PLATELET # BLD AUTO: 205 K/UL (ref 135–450)
PMV BLD AUTO: 7.8 FL (ref 5–10.5)
POTASSIUM SERPL-SCNC: 4.3 MMOL/L (ref 3.5–5.1)
PROT SERPL-MCNC: 6.1 G/DL (ref 6.4–8.2)
PSA SERPL DL<=0.01 NG/ML-MCNC: 2.18 NG/ML (ref 0–4)
RBC # BLD AUTO: 4.87 M/UL (ref 4.2–5.9)
SODIUM SERPL-SCNC: 135 MMOL/L (ref 136–145)
TRIGL SERPL-MCNC: 77 MG/DL (ref 0–150)
VLDLC SERPL CALC-MCNC: 15 MG/DL
WBC # BLD AUTO: 4.6 K/UL (ref 4–11)

## 2025-03-27 PROCEDURE — 99396 PREV VISIT EST AGE 40-64: CPT | Performed by: STUDENT IN AN ORGANIZED HEALTH CARE EDUCATION/TRAINING PROGRAM

## 2025-03-27 SDOH — ECONOMIC STABILITY: FOOD INSECURITY: WITHIN THE PAST 12 MONTHS, YOU WORRIED THAT YOUR FOOD WOULD RUN OUT BEFORE YOU GOT MONEY TO BUY MORE.: NEVER TRUE

## 2025-03-27 SDOH — ECONOMIC STABILITY: FOOD INSECURITY: WITHIN THE PAST 12 MONTHS, THE FOOD YOU BOUGHT JUST DIDN'T LAST AND YOU DIDN'T HAVE MONEY TO GET MORE.: NEVER TRUE

## 2025-03-27 ASSESSMENT — PATIENT HEALTH QUESTIONNAIRE - PHQ9
SUM OF ALL RESPONSES TO PHQ QUESTIONS 1-9: 0
2. FEELING DOWN, DEPRESSED OR HOPELESS: NOT AT ALL
SUM OF ALL RESPONSES TO PHQ QUESTIONS 1-9: 0
SUM OF ALL RESPONSES TO PHQ QUESTIONS 1-9: 0
1. LITTLE INTEREST OR PLEASURE IN DOING THINGS: NOT AT ALL
SUM OF ALL RESPONSES TO PHQ QUESTIONS 1-9: 0

## 2025-03-27 NOTE — PROGRESS NOTES
Well Adult Note  Name: Narayan Jones Today’s Date: 4/3/2025   MRN: [id number] Sex: Male   Age: 64 y.o. Ethnicity: Non- / Non    : 1960 Race: White (non-)      Narayan Jones is here for a well adult exam.       Assessment & Plan   Annual physical exam  -     CBC with Auto Differential; Future  -     Comprehensive Metabolic Panel; Future  -     PSA, Prostatic Specific Antigen ([place]); Future  -     Lipid, Fasting; Future  Lipid screening  -     Lipid, Fasting; Future    Counseled on vaccinations, age appropriate screenings  F.u labs  Bp well controlled        Return in about 1 year (around 3/27/2026).       Subjective   History:  63 yo M presents for annual exam. No acute complaints. Doing well, currently not on any medications    Review of Systems   Constitutional:  Negative for chills and fatigue.   HENT:  Negative for congestion and sore throat.    Respiratory:  Negative for shortness of breath and wheezing.    Cardiovascular:  Negative for chest pain and palpitations.   Gastrointestinal:  Negative for abdominal pain and nausea.   Genitourinary:  Negative for frequency and urgency.   Neurological:  Negative for light-headedness.       No Known Allergies  Prior to Visit Medications    Medication Sig Taking? Authorizing Provider   Multiple Vitamins-Minerals (THERAPEUTIC MULTIVITAMIN-MINERALS) tablet Take 1 tablet by mouth daily Yes Provider, MD Shannan     Past Medical History:   Diagnosis Date    Circulation problem     left hip     Past Surgical History:   Procedure Laterality Date    COLONOSCOPY  2012    int hemorrhoids, polyps times 4    COLONOSCOPY N/A 2024    COLONOSCOPY POLYPECTOMY SNARE/BIOPSY performed by Reagan Lorenzo MD at [facility] ENDOSCOPY    HERNIA REPAIR Left     JOINT REPLACEMENT      LEFT HIP REPLACEMENT     Family History   Problem Relation Age of Onset    Lung Cancer Mother     Lung Disease Mother     Other Father         Padget's Disease    Breast

## 2025-04-03 ASSESSMENT — ENCOUNTER SYMPTOMS
NAUSEA: 0
SHORTNESS OF BREATH: 0
ABDOMINAL PAIN: 0
SORE THROAT: 0
WHEEZING: 0

## 2025-04-04 ENCOUNTER — RESULTS FOLLOW-UP (OUTPATIENT)
Dept: FAMILY MEDICINE CLINIC | Age: 65
End: 2025-04-04

## 2025-04-04 NOTE — RESULT ENCOUNTER NOTE
Fred's recent labs came back normal. Except his cholesterol went up a little. We will monitor if continues to trend up recommend starting statin

## (undated) DEVICE — ENDOSCOPY KIT: Brand: MEDLINE INDUSTRIES, INC.

## (undated) DEVICE — SNARE VASC L240CM LOOP W10MM SHTH DIA2.4MM RND STIFF CLD

## (undated) DEVICE — FORCEPS BX L240CM JAW DIA2.8MM L CAP W/ NDL MIC MESH TOOTH